# Patient Record
Sex: MALE | Race: WHITE | NOT HISPANIC OR LATINO | URBAN - NONMETROPOLITAN AREA
[De-identification: names, ages, dates, MRNs, and addresses within clinical notes are randomized per-mention and may not be internally consistent; named-entity substitution may affect disease eponyms.]

---

## 2019-10-18 ENCOUNTER — IMPORTED ENCOUNTER (OUTPATIENT)
Dept: URBAN - NONMETROPOLITAN AREA CLINIC 1 | Facility: CLINIC | Age: 69
End: 2019-10-18

## 2019-10-18 PROCEDURE — 92014 COMPRE OPH EXAM EST PT 1/>: CPT

## 2019-10-18 PROCEDURE — 92133 CPTRZD OPH DX IMG PST SGM ON: CPT

## 2019-10-18 NOTE — PATIENT DISCUSSION
Glaucoma Suspect-Based on disc Asym. - C/D's are 0.3 OD and 0.4 OS - IOP's today were 14 OU. -Appears stable at this time.-Continue to monitor with exams and testing. oct today stable ouCataract OU-Not yet surgical. -Reviewed symptoms of advancing cataract growth such as glare and halos and decreased vision.-Continue to monitor for now. Pt will notify us if any new symptoms develop.

## 2021-03-02 ENCOUNTER — IMPORTED ENCOUNTER (OUTPATIENT)
Dept: URBAN - NONMETROPOLITAN AREA CLINIC 1 | Facility: CLINIC | Age: 71
End: 2021-03-02

## 2021-03-02 ENCOUNTER — PREPPED CHART (OUTPATIENT)
Dept: RURAL CLINIC 1 | Facility: CLINIC | Age: 71
End: 2021-03-02

## 2021-03-02 PROBLEM — H40.013: Noted: 2021-03-02

## 2021-03-02 PROCEDURE — 92014 COMPRE OPH EXAM EST PT 1/>: CPT

## 2021-03-02 PROCEDURE — 92133 CPTRZD OPH DX IMG PST SGM ON: CPT

## 2021-03-02 NOTE — PATIENT DISCUSSION
OCT RT no progression of disease, full thickness rims, OCT LT no progression of disease, full thickness rims.

## 2021-03-02 NOTE — PATIENT DISCUSSION
Glaucoma Suspect-Based on disc Asym. - C/D's are 0.3 OD and 0.4 OS - IOP's today WERE STABLE -Continue to monitor with exams and testing. oct today stable ouCataract OU-Not yet surgical. -Reviewed symptoms of advancing cataract growth such as glare and halos and decreased vision.-Continue to monitor for now. Pt will notify us if any new symptoms develop.

## 2022-03-02 ASSESSMENT — PACHYMETRY
OS_CT_UM: 514
OD_CT_UM: 478

## 2022-03-02 ASSESSMENT — VISUAL ACUITY
OS_SC: 20/25
OD_SC: 20/25-2

## 2022-03-02 ASSESSMENT — TONOMETRY
OS_IOP_MMHG: 13
OD_IOP_MMHG: 13

## 2022-03-07 ENCOUNTER — COMPREHENSIVE EXAM (OUTPATIENT)
Dept: RURAL CLINIC 1 | Facility: CLINIC | Age: 72
End: 2022-03-07

## 2022-03-07 DIAGNOSIS — H40.013: ICD-10-CM

## 2022-03-07 DIAGNOSIS — H52.4: ICD-10-CM

## 2022-03-07 DIAGNOSIS — H25.093: ICD-10-CM

## 2022-03-07 PROCEDURE — 92133 CPTRZD OPH DX IMG PST SGM ON: CPT

## 2022-03-07 PROCEDURE — 92014 COMPRE OPH EXAM EST PT 1/>: CPT

## 2022-03-07 ASSESSMENT — VISUAL ACUITY
OU_SC: 20/25+2
OS_SC: 20/30
OU_CC: J1
OD_SC: 20/25-1

## 2022-03-07 ASSESSMENT — TONOMETRY
OS_IOP_MMHG: 15
OD_IOP_MMHG: 16

## 2022-04-09 ASSESSMENT — VISUAL ACUITY
OS_CC: 20/25
OU_CC: J1+
OD_CC: 20/25-2
OS_CC: 20/30
OD_CC: 20/25-2

## 2022-04-09 ASSESSMENT — PACHYMETRY
OS_CT_UM: 514; ADJ: VTHIN
OD_CT_UM: 478; ADJ: VTHIN
OS_CT_UM: 514; ADJ: VTHIN
OD_CT_UM: 478; ADJ: VTHIN

## 2022-04-09 ASSESSMENT — TONOMETRY
OS_IOP_MMHG: 13
OD_IOP_MMHG: 12
OD_IOP_MMHG: 13
OS_IOP_MMHG: 10

## 2023-01-31 RX ORDER — ATORVASTATIN CALCIUM 10 MG/1
10 TABLET, FILM COATED ORAL DAILY
COMMUNITY

## 2023-01-31 RX ORDER — METOPROLOL SUCCINATE 25 MG/1
12.5 TABLET, EXTENDED RELEASE ORAL DAILY
COMMUNITY

## 2023-01-31 RX ORDER — OMEPRAZOLE 40 MG/1
40 CAPSULE, DELAYED RELEASE ORAL DAILY
COMMUNITY

## 2023-01-31 RX ORDER — ASPIRIN 81 MG/1
81 TABLET ORAL DAILY
COMMUNITY

## 2023-01-31 RX ORDER — CLOPIDOGREL BISULFATE 75 MG/1
75 TABLET ORAL DAILY
COMMUNITY

## 2024-11-01 ENCOUNTER — COMPREHENSIVE EXAM (OUTPATIENT)
Dept: RURAL CLINIC 1 | Facility: CLINIC | Age: 74
End: 2024-11-01

## 2024-11-01 DIAGNOSIS — H25.093: ICD-10-CM

## 2024-11-01 DIAGNOSIS — H40.013: ICD-10-CM

## 2024-11-01 DIAGNOSIS — H52.4: ICD-10-CM

## 2024-11-01 PROCEDURE — 92014 COMPRE OPH EXAM EST PT 1/>: CPT

## 2024-11-01 PROCEDURE — 92083 EXTENDED VISUAL FIELD XM: CPT
